# Patient Record
(demographics unavailable — no encounter records)

---

## 2025-03-02 NOTE — HISTORY OF PRESENT ILLNESS
[de-identified] : 08/12/2024: Patient presents s/p revision PSF T11-S1 (DOI:1/16/24).  Pt had covid 2 weeks prior. Reports exacerbation of back pains since illness. She reports increase in SIJ pains since covid which was present prior to virus. She reports back pain exacerbated by rain. C/o significant RT buttock pain.  Pt walked 12 miles the week prior. She stopped taking Gabapentin due to headache and heart palpation side effect. Chief complaint is SIJ pains.     06/24/2024: Patient presents s/p revision PSF T11-S1 (DOI:1/16/24). She reports increased pains after walking for 2 miles the week prior. Back pain wraps around to LT side and to front of body. C/o LT buttock pain. Pain radiates to groin. She is treating with 7.5mg Percocet intermittently, she can take up to 2 tablets a day. She has been treating with OTC Aleve and topical gels and states relief. Anterior LT thigh pain. Numbness is improving.    04/08/2024: Patient presents s/p revision PSF T11-S1 (DOI:1/16/24). Pt reports a fall on 4/4/24 due to balance loss. Reports increase pain in low back since fall. C/o left leg pain. Back pain > LLE. She reports left leg buckling.   02/26/2024: Patient presents s/p Revision PSF T11-S1 (DOI:1/16/24). Continues to improve. No complaints. Has stopped treating with Oxycodone, now treating with Percocet.   02/05/2024: Patient presenting today for 1st POV s/p Revision PSF T11-S1 (DOI:1/16/24). She reports mild buttock pain. Improved pre-op symptoms.  Patient denies fevers, acute weakness, unexpected weight loss, urinary incontinence, and bowel incontinence. She has been treating with Oxycodone every 2 hours.  Treating with muscle relaxers.    11/20/2023: Patient presenting today for a follow up surgical discussion. No change in symptoms compared to last visit.   10/27/2023: Patient presenting today for an MRI results review. Similar symptoms to last visit. Back pain with LE pain B/L. Complaint of subjective weakness in hips, quads, leg pain, b/l, L>R.   10/09/2023: Patient presenting today for a new issue. Low back pain with radiating pain into both hips. Recent episode on 06/2023. Patient was seen at [facility], kept in hospital for 3 days. Was indicated for sx by [facility] but declined. Back pain did not improve. Reports on 10/3/23 she walked for 7 hours with son, following morning experienced increase in back and hip pain. Reports hip pain kept increasing, increase in back pain caused to bed bound for 3 days. Complaint of subjective weakness in hips, quads, leg pain, b/l, L>R, began in April. Frequent gait unsteadiness and gait disturbances. Pain level is a 6-7/10. Constant pain in low back. Hip pain began last week. Has treated with chiropractor and states relief.   SX: laminectomy 2008 w/ Dr. Christensen, revision fusion L3-5 w/ Dr. Fernandez (2014)        [FreeTextEntry5] : The patient is a 61 year female who presents today complaining of low back pain radiating into both hips. Left leg sciatic pain Date of Injury/Onset: June 2023 Pain:    At Rest: 6-7/10  With Activity:  0/10  Mechanism of injury: no injury Quality of symptoms: achy and dull, sharp at times Improves with: walking Worse with: sitting Prior treatment: chiropractor, pain meds Prior Imaging: [facility] Additional Information: None

## 2025-03-02 NOTE — PHYSICAL EXAM
[NL (90)] : forward flexion 90 degrees [Bending to left] : bending to left [Bending to right] : bending to right [Absent] : achilles reflex absent [de-identified] : Constitutional: - General Appearance: Unremarkable Body Habitus Well Developed Well Nourished Body Habitus No Deformities Well Groomed Ability To communicate: Normal Neurologic: Global sensation is intact to upper and lower extremities. See examination of Neck and/or Spine for exceptions. Orientation to Time, Place and Person is: Normal Mood And Affect is Normal Skin: - Head/Face, Right Upper/Lower Extremity, Left Upper/Lower Extremity: Normal See Examination of Neck and/or Spine for exceptions Cardiovascular: Peripheral Cardiovascular System is Normal Palpation of Lymph Nodes: Normal Palpation of lymph nodes in: Axilla, Cervical, Inguinal Abnormal Palpation of lymph nodes in: None  [] : non-antalgic [FreeTextEntry3] : well healed scar in the midline.  [FreeTextEntry8] : RT sided SIJ tenderness.  [de-identified] : L3 and L4 paresthesia's on RT.  [de-identified] : extension 10 degrees [de-identified] : left lateral bending 10 degrees

## 2025-03-02 NOTE — PHYSICAL EXAM
[NL (90)] : forward flexion 90 degrees [Bending to left] : bending to left [Bending to right] : bending to right [Absent] : achilles reflex absent [de-identified] : Constitutional: - General Appearance: Unremarkable Body Habitus Well Developed Well Nourished Body Habitus No Deformities Well Groomed Ability To communicate: Normal Neurologic: Global sensation is intact to upper and lower extremities. See examination of Neck and/or Spine for exceptions. Orientation to Time, Place and Person is: Normal Mood And Affect is Normal Skin: - Head/Face, Right Upper/Lower Extremity, Left Upper/Lower Extremity: Normal See Examination of Neck and/or Spine for exceptions Cardiovascular: Peripheral Cardiovascular System is Normal Palpation of Lymph Nodes: Normal Palpation of lymph nodes in: Axilla, Cervical, Inguinal Abnormal Palpation of lymph nodes in: None  [] : non-antalgic [FreeTextEntry3] : well healed scar in the midline.  [FreeTextEntry8] : RT sided SIJ tenderness.  [de-identified] : L3 and L4 paresthesia's on RT.  [de-identified] : extension 10 degrees [de-identified] : left lateral bending 10 degrees

## 2025-03-02 NOTE — DATA REVIEWED
[FreeTextEntry1] : On my interpretations of these images from [facility] on 08/12/2024: I have independently reviewed and interpreted these images. 2 v lumbar x-ray- T11- S2 PSF all hardware in good position. very mild halo formation around S2 screws, no failure of hardware  On my interpretations of these images from [facility] on 08/12/2024: I have independently reviewed and interpreted these images. 2 V T spine XR- mild kyphosis at T10-11 segment, superior endplate deformity T10-11 chronic, multilevel mild-mod DDD of thoracic spine.   On my interpretations of these images from [facility] on 06/24/2024: I have independently reviewed and interpreted these images. 2 V thoracic XR- superior endplate T11 does show a superior end plate compression deformity but is unchanged.   On my interpretations of these images from [facility] on 04/08/2024: I have independently reviewed and interpreted these images and reports. Lumbar XR 4 V- s/p T11-pelvis PSF instrumentation, all hardware in good position, no complications, flex/ex does not demonstrate motion.   On my interpretations of these images from [facility] on 04/08/2024: I have independently reviewed and interpreted these images and reports. 2 V thoracic XR- possible T11 compression deformity, no junctional kyphosis or fxs.    On my interpretations of these images from [facility] on 02/26/2024: I have independently reviewed and interpreted these images and reports. 2 V lumbar and thoracic XRs- s/p T11-S2, all hardware well placed, no signs of hardware failure.   On my interpretations of these images from [facility] on 02/05/2024: I have independently reviewed and interpreted these images and reports. s/p T11-pelvis PSF, interbody cages at L4/5 and L5/S1. hardware is in good position, well main lordosis at 68 degrees.   On my interpretation of these images from [facility] on 11/15/23. I have additionally reviewed the radiologist report. CT images were reviewed on today's visit. Revealed healed L3/4.   On my interpretation of these images from [facility] on 10/13/23. I have additionally reviewed the radiologist report. MRI images were reviewed on today's visit. LUMBAR MRI reveals prior L3-5 PSF L5-S1: grade 1/2 listhesis progressive compared to prior MRI resulting in severe bl fs,  L4-5: PSF L3-4: PSF L2-3: adv ddd retrolisthesis, mod to severe lateral recess stenosis L1-2: adv ddd retrolisthesis, mod bl fs mild central stenosis T12-1: mild ddd no stenosis T11-12: mod DDD no stenosis

## 2025-03-02 NOTE — HISTORY OF PRESENT ILLNESS
[de-identified] : 08/12/2024: Patient presents s/p revision PSF T11-S1 (DOI:1/16/24).  Pt had covid 2 weeks prior. Reports exacerbation of back pains since illness. She reports increase in SIJ pains since covid which was present prior to virus. She reports back pain exacerbated by rain. C/o significant RT buttock pain.  Pt walked 12 miles the week prior. She stopped taking Gabapentin due to headache and heart palpation side effect. Chief complaint is SIJ pains.     06/24/2024: Patient presents s/p revision PSF T11-S1 (DOI:1/16/24). She reports increased pains after walking for 2 miles the week prior. Back pain wraps around to LT side and to front of body. C/o LT buttock pain. Pain radiates to groin. She is treating with 7.5mg Percocet intermittently, she can take up to 2 tablets a day. She has been treating with OTC Aleve and topical gels and states relief. Anterior LT thigh pain. Numbness is improving.    04/08/2024: Patient presents s/p revision PSF T11-S1 (DOI:1/16/24). Pt reports a fall on 4/4/24 due to balance loss. Reports increase pain in low back since fall. C/o left leg pain. Back pain > LLE. She reports left leg buckling.   02/26/2024: Patient presents s/p Revision PSF T11-S1 (DOI:1/16/24). Continues to improve. No complaints. Has stopped treating with Oxycodone, now treating with Percocet.   02/05/2024: Patient presenting today for 1st POV s/p Revision PSF T11-S1 (DOI:1/16/24). She reports mild buttock pain. Improved pre-op symptoms.  Patient denies fevers, acute weakness, unexpected weight loss, urinary incontinence, and bowel incontinence. She has been treating with Oxycodone every 2 hours.  Treating with muscle relaxers.    11/20/2023: Patient presenting today for a follow up surgical discussion. No change in symptoms compared to last visit.   10/27/2023: Patient presenting today for an MRI results review. Similar symptoms to last visit. Back pain with LE pain B/L. Complaint of subjective weakness in hips, quads, leg pain, b/l, L>R.   10/09/2023: Patient presenting today for a new issue. Low back pain with radiating pain into both hips. Recent episode on 06/2023. Patient was seen at [facility], kept in hospital for 3 days. Was indicated for sx by [facility] but declined. Back pain did not improve. Reports on 10/3/23 she walked for 7 hours with son, following morning experienced increase in back and hip pain. Reports hip pain kept increasing, increase in back pain caused to bed bound for 3 days. Complaint of subjective weakness in hips, quads, leg pain, b/l, L>R, began in April. Frequent gait unsteadiness and gait disturbances. Pain level is a 6-7/10. Constant pain in low back. Hip pain began last week. Has treated with chiropractor and states relief.   SX: laminectomy 2008 w/ Dr. Christensen, revision fusion L3-5 w/ Dr. Fernandez (2014)        [FreeTextEntry5] : The patient is a 61 year female who presents today complaining of low back pain radiating into both hips. Left leg sciatic pain Date of Injury/Onset: June 2023 Pain:    At Rest: 6-7/10  With Activity:  0/10  Mechanism of injury: no injury Quality of symptoms: achy and dull, sharp at times Improves with: walking Worse with: sitting Prior treatment: chiropractor, pain meds Prior Imaging: [facility] Additional Information: None

## 2025-03-02 NOTE — REASON FOR VISIT
[Spouse] : spouse [FreeTextEntry2] : follow up s/p REVISION POSTERIOR SPINAL FUSION T11-S1 PELVIS INSTRUMENTATION PLIF L5-S1 JUAN DOS 1/6/24

## 2025-03-02 NOTE — DISCUSSION/SUMMARY
[de-identified] : 61 y/o female s/p revision PSF T11-S1 (DOI:1/16/24). XRs taken today reveal very mild halo formation around S2 screws, no failure of hardware.  She may resume walking as she did pre-operatively but to be cautious of body mechanics and only as tolerated. Discussed proper body mechanics and modified physical activity to avoid aggravation of symptoms. Rx'd Robaxin to aid in symptom control. D/c gabapentin as she is noticing no improvement from it.  She has chronic back pain a year out s/p extensive and revision thoraco-lumbar surgery.   We will obtain CT of T & L spine as well as MRI of T & L spine to check for any pathology ( pseudarthrosis, hardware complication, residual or progressive stenosis, adjacent segment degeneration.). If there are no clear etiologies, she may be a candidate for Dorsal Column Stimulator.  Prior to appointment and during 0encounter with patient extensive medical records were reviewed including but not limited to, hospital records, out patient records, imaging results, and lab data. During this appointment the patient was examined, diagnoses were discussed and explained in a face to face manner. In addition extensive time was spent reviewing aforementioned diagnostic studies. Counseling including abnormal image results, differential diagnoses, treatment options, risk and benefits, lifestyle changes, current condition, and current medications was performed. Patient's comments, questions, and concerns were address and patient verbalized understanding. Based on this patient's presentation at our office, which is an orthopedic spine surgeon's office, this patient inherently / intrinsically has a risk, however minute, of developing issues such as Cauda equina syndrome, bowel and bladder changes, or progression of motor or neurological deficits such as paralysis which may be permanent.    I, Carleen Willis, attest that this documentation has been prepared under the direction and in the presence of provider Ricardo Fernandez MD.

## 2025-03-02 NOTE — PROCEDURE
[FreeTextEntry3] :  Discussed risks, benefits, and alternatives as well as contents of injection. Risks include, but are not limited allergic reaction, flare reaction, injection site pain, bruising, numbness, increased blood sugar, skin discoloration, fat atrophy, tendon rupture, and infection. Patient understands and would like to proceed with injection.  The skin over the left greater trochanteric was cleansed with Betadine and anesthetized with ethyl chloride. 1 cc 40mg of Kenalog and 4 cc of 0.5% bupivacaine were injected.  Site was dressed with a band-aid. Patient tolerated the procedure well.  Advised to use ice packs every 20 min for the next 24h.

## 2025-03-02 NOTE — DISCUSSION/SUMMARY
[de-identified] : 63 y/o female s/p revision PSF T11-S1 (DOI:1/16/24). XRs taken today reveal very mild halo formation around S2 screws, no failure of hardware.  She may resume walking as she did pre-operatively but to be cautious of body mechanics and only as tolerated. Discussed proper body mechanics and modified physical activity to avoid aggravation of symptoms. Rx'd Robaxin to aid in symptom control. D/c gabapentin as she is noticing no improvement from it.  She has chronic back pain a year out s/p extensive and revision thoraco-lumbar surgery.   We will obtain CT of T & L spine as well as MRI of T & L spine to check for any pathology ( pseudarthrosis, hardware complication, residual or progressive stenosis, adjacent segment degeneration.). If there are no clear etiologies, she may be a candidate for Dorsal Column Stimulator.  Prior to appointment and during 0encounter with patient extensive medical records were reviewed including but not limited to, hospital records, out patient records, imaging results, and lab data. During this appointment the patient was examined, diagnoses were discussed and explained in a face to face manner. In addition extensive time was spent reviewing aforementioned diagnostic studies. Counseling including abnormal image results, differential diagnoses, treatment options, risk and benefits, lifestyle changes, current condition, and current medications was performed. Patient's comments, questions, and concerns were address and patient verbalized understanding. Based on this patient's presentation at our office, which is an orthopedic spine surgeon's office, this patient inherently / intrinsically has a risk, however minute, of developing issues such as Cauda equina syndrome, bowel and bladder changes, or progression of motor or neurological deficits such as paralysis which may be permanent.    I, Carleen Willis, attest that this documentation has been prepared under the direction and in the presence of provider Ricardo Fernandez MD.

## 2025-03-02 NOTE — DATA REVIEWED
[FreeTextEntry1] : On my interpretations of these images from [facility] on 08/12/2024: I have independently reviewed and interpreted these images. 2 v lumbar x-ray- T11- S2 PSF all hardware in good position. very mild halo formation around S2 screws, no failure of hardware  On my interpretations of these images from [facility] on 08/12/2024: I have independently reviewed and interpreted these images. 2 V T spine XR- mild kyphosis at T10-11 segment, superior endplate deformity T10-11 chronic, multilevel mild-mod DDD of thoracic spine.   On my interpretations of these images from [facility] on 06/24/2024: I have independently reviewed and interpreted these images. 2 V thoracic XR- superior endplate T11 does show a superior end plate compression deformity but is unchanged.   On my interpretations of these images from [facility] on 04/08/2024: I have independently reviewed and interpreted these images and reports. Lumbar XR 4 V- s/p T11-pelvis PSF instrumentation, all hardware in good position, no complications, flex/ex does not demonstrate motion.   On my interpretations of these images from [facility] on 04/08/2024: I have independently reviewed and interpreted these images and reports. 2 V thoracic XR- possible T11 compression deformity, no junctional kyphosis or fxs.    On my interpretations of these images from [facility] on 02/26/2024: I have independently reviewed and interpreted these images and reports. 2 V lumbar and thoracic XRs- s/p T11-S2, all hardware well placed, no signs of hardware failure.   On my interpretations of these images from [facility] on 02/05/2024: I have independently reviewed and interpreted these images and reports. s/p T11-pelvis PSF, interbody cages at L4/5 and L5/S1. hardware is in good position, well main lordosis at 68 degrees.   On my interpretation of these images from [facility] on 11/15/23. I have additionally reviewed the radiologist report. CT images were reviewed on today's visit. Revealed healed L3/4.   On my interpretation of these images from [facility] on 10/13/23. I have additionally reviewed the radiologist report. MRI images were reviewed on today's visit. LUMBAR MRI reveals prior L3-5 PSF L5-S1: grade 1/2 listhesis progressive compared to prior MRI resulting in severe bl fs,  L4-5: PSF L3-4: PSF L2-3: adv ddd retrolisthesis, mod to severe lateral recess stenosis L1-2: adv ddd retrolisthesis, mod bl fs mild central stenosis T12-1: mild ddd no stenosis T11-12: mod DDD no stenosis